# Patient Record
Sex: MALE | Race: WHITE | NOT HISPANIC OR LATINO | URBAN - METROPOLITAN AREA
[De-identification: names, ages, dates, MRNs, and addresses within clinical notes are randomized per-mention and may not be internally consistent; named-entity substitution may affect disease eponyms.]

---

## 2021-04-27 ENCOUNTER — OFFICE VISIT (OUTPATIENT)
Dept: URGENT CARE | Facility: CLINIC | Age: 31
End: 2021-04-27
Payer: COMMERCIAL

## 2021-04-27 ENCOUNTER — APPOINTMENT (OUTPATIENT)
Dept: RADIOLOGY | Facility: CLINIC | Age: 31
End: 2021-04-27
Payer: COMMERCIAL

## 2021-04-27 VITALS
HEIGHT: 71 IN | DIASTOLIC BLOOD PRESSURE: 82 MMHG | OXYGEN SATURATION: 97 % | BODY MASS INDEX: 24.22 KG/M2 | RESPIRATION RATE: 18 BRPM | HEART RATE: 110 BPM | TEMPERATURE: 99 F | SYSTOLIC BLOOD PRESSURE: 142 MMHG | WEIGHT: 173 LBS

## 2021-04-27 DIAGNOSIS — M70.61 GREATER TROCHANTERIC BURSITIS OF RIGHT HIP: Primary | ICD-10-CM

## 2021-04-27 DIAGNOSIS — M70.61 GREATER TROCHANTERIC BURSITIS OF RIGHT HIP: ICD-10-CM

## 2021-04-27 PROCEDURE — 73502 X-RAY EXAM HIP UNI 2-3 VIEWS: CPT

## 2021-04-27 PROCEDURE — 99213 OFFICE O/P EST LOW 20 MIN: CPT | Performed by: FAMILY MEDICINE

## 2021-04-27 RX ORDER — PREDNISONE 20 MG/1
40 TABLET ORAL DAILY
Qty: 10 TABLET | Refills: 0 | Status: SHIPPED | OUTPATIENT
Start: 2021-04-27 | End: 2021-05-02

## 2021-04-27 NOTE — PROGRESS NOTES
[id number] [facility]        NAME: Gold Child is a 31 y o  male  : 1990    MRN: [id number]  DATE: 2021  TIME: 7:12 PM    Assessment and Plan   Greater trochanteric bursitis of right hip [M70 61]  1  Greater trochanteric bursitis of right hip  XR hip/pelv 2-3 vws right if performed         Patient Instructions       Follow up with PCP in 3-5 days  Proceed to  ER if symptoms worsen  Chief Complaint     Chief Complaint   Patient presents with    Hip Pain     Right  No trauma pain started saturday  His doctor/chiropractor wants him to get it check out and a xray  History of Present Illness        65-year-old male with severe pain along the lateral aspect of the right hip for the past 3 days  Patient does not recall any injuries or trauma but states that his pain has been radiating down the right side of his leg to the point where he is unable sleep at night  Denies any joint warmth, crepitus or swelling  Denies any extremity weakness, numbness or tingling      Review of Systems   Review of Systems   Constitutional: Negative  HENT: Negative  Eyes: Negative  Respiratory: Negative  Cardiovascular: Negative  Gastrointestinal: Negative  Genitourinary: Negative  Musculoskeletal: Positive for arthralgias and myalgias  Skin: Negative  Allergic/Immunologic: Negative  Neurological: Negative  Hematological: Negative  Psychiatric/Behavioral: Negative  Current Medications     No current outpatient medications on file  Current Allergies     Allergies as of 2021    (No Known Allergies)            The following portions of the patient's history were reviewed and updated as appropriate: allergies, current medications, past family history, past medical history, past social history, past surgical history and problem list      No past medical history on file  No past surgical history on file      No family history on file       Medications have been verified  Objective   /82   Pulse (!) 110   Temp 99 °F (37 2 °C)   Resp 18   Ht 5' 11" (1 803 m)   Wt 78 5 kg (173 lb)   SpO2 97%   BMI 24 13 kg/m²   No LMP for male patient  Physical Exam     Physical Exam  Constitutional:       Appearance: He is well-developed  Eyes:      Pupils: Pupils are equal, round, and reactive to light  Neck:      Musculoskeletal: Normal range of motion  Pulmonary:      Effort: Pulmonary effort is normal    Musculoskeletal:      Right hip: He exhibits decreased range of motion, tenderness and bony tenderness  Legs:    Skin:     General: Skin is warm  Neurological:      Mental Status: He is alert

## 2023-05-21 ENCOUNTER — OFFICE VISIT (OUTPATIENT)
Dept: URGENT CARE | Facility: CLINIC | Age: 33
End: 2023-05-21

## 2023-05-21 VITALS
OXYGEN SATURATION: 98 % | SYSTOLIC BLOOD PRESSURE: 129 MMHG | HEART RATE: 80 BPM | RESPIRATION RATE: 18 BRPM | TEMPERATURE: 97.7 F | DIASTOLIC BLOOD PRESSURE: 71 MMHG
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
## 2023-05-21 RX ORDER — AMOXICILLIN AND CLAVULANATE POTASSIUM 875; 125 MG/1; MG/1
1 TABLET, FILM COATED ORAL EVERY 12 HOURS SCHEDULED
Qty: 20 TABLET | Refills: 0 | Status: SHIPPED | OUTPATIENT
Start: 2023-05-21 | End: 2023-05-31

## 2023-05-21 RX ORDER — GINSENG 100 MG
1 CAPSULE ORAL ONCE
Status: COMPLETED | OUTPATIENT
Start: 2023-05-21 | End: 2023-05-21

## 2023-05-21 RX ADMIN — Medication 1 SMALL APPLICATION: at 20:59

## 2023-05-21 NOTE — PATIENT INSTRUCTIONS
You have been prescribed Augmentin for infection prophylaxis - take as directed  Recheck immediately for any signs of infection - fever/chills, increasing swelling or pain, redness, red line streaking, or pus drainage  Follow-up with your PCP as needed  Go to the ED for any severely worsening symptoms

## 2023-05-21 NOTE — PROGRESS NOTES
[facility]        NAME: Amarjit Ortega is a 34 y o  male  : 1990    MRN: [id number]  DATE: May 24, 2023  TIME: 5:55 PM    Assessment and Plan   Dog bite, initial encounter [W54  0XXA]  1  Dog bite, initial encounter  amoxicillin-clavulanate (AUGMENTIN) 875-125 mg per tablet    bacitracin topical ointment 1 small application            Patient Instructions     You have been prescribed Augmentin for infection prophylaxis - take as directed  Recheck immediately for any signs of infection - fever/chills, increasing swelling or pain, redness, red line streaking, or pus drainage  Follow-up with your PCP as needed  Go to the ED for any severely worsening symptoms  Chief Complaint     Chief Complaint   Patient presents with   • Dog Bite Right Hand and Left Arm     Pt reports that neighbor's dog (animal is up to date on rabies vaccines) bit pt on left arm and right hand last night  Pt reports he is up to date on tetanus vaccine ()  History of Present Illness       This is a 29yo male who presents for evaluation of dog bites  Patient reports he was walking across the backyard towards his house last night in the dark when he accidentally spooked his neighbors dog  The dog jumped up and bit his left forearm  He tried to push the dog off with his right hand, resulting in additional bites and scratches  Patient states the dog is usually a friendly family dog and is UTD with vaccinations  He is UTD on tetanus (received in )  Has been applying antibiotic ointment and keeping the areas covered with dry dressings  Is requesting antibiotics today to prevent infection  Review of Systems   Review of Systems   Constitutional: Negative for chills and fever  Respiratory: Negative for shortness of breath  Cardiovascular: Negative for chest pain  Musculoskeletal: Positive for arthralgias, joint swelling (right hand) and myalgias  Skin: Positive for wound  Current Medications       Current Outpatient Medications:   •  amoxicillin-clavulanate (AUGMENTIN) 875-125 mg per tablet, Take 1 tablet by mouth every 12 (twelve) hours for 10 days, Disp: 20 tablet, Rfl: 0  •  Diclofenac Sodium (Voltaren) 1 %, Apply 2 g topically 4 (four) times a day (Patient not taking: Reported on 5/21/2023), Disp: 1 Tube, Rfl: 0    Current Allergies     Allergies as of 05/21/2023   • (No Known Allergies)            The following portions of the patient's history were reviewed and updated as appropriate: allergies, current medications, past family history, past medical history, past social history, past surgical history and problem list      History reviewed  No pertinent past medical history  History reviewed  No pertinent surgical history  History reviewed  No pertinent family history  Medications have been verified  Objective   /71 (BP Location: Right arm, Patient Position: Sitting, Cuff Size: Standard)   Pulse 80   Temp 97 7 °F (36 5 °C) (Tympanic)   Resp 18   SpO2 98%        Physical Exam     Physical Exam  Vitals and nursing note reviewed  Constitutional:       General: He is not in acute distress  Appearance: Normal appearance  He is not ill-appearing, toxic-appearing or diaphoretic  HENT:      Head: Normocephalic  Right Ear: External ear normal       Left Ear: External ear normal       Nose: Nose normal       Mouth/Throat:      Mouth: Mucous membranes are moist    Eyes:      Conjunctiva/sclera: Conjunctivae normal    Cardiovascular:      Rate and Rhythm: Normal rate and regular rhythm  Pulses: Normal pulses  Heart sounds: Normal heart sounds  Pulmonary:      Effort: Pulmonary effort is normal       Breath sounds: Normal breath sounds  Musculoskeletal:         General: Normal range of motion  Arms:         Hands:       Cervical back: Normal range of motion and neck supple  Comments: FROM LUE and right hand  NVI  Skin:     General: Skin is warm and dry  Capillary Refill: Capillary refill takes less than 2 seconds  Neurological:      Mental Status: He is alert and oriented to person, place, and time        Gait: Gait normal    Psychiatric:         Mood and Affect: Mood normal          Behavior: Behavior normal